# Patient Record
Sex: FEMALE | URBAN - METROPOLITAN AREA
[De-identification: names, ages, dates, MRNs, and addresses within clinical notes are randomized per-mention and may not be internally consistent; named-entity substitution may affect disease eponyms.]

---

## 2023-03-01 ENCOUNTER — APPOINTMENT (RX ONLY)
Age: 28
Setting detail: DERMATOLOGY
End: 2023-03-01

## 2023-03-01 DIAGNOSIS — T07XXXA INSECT BITE, NONVENOMOUS, OF OTHER, MULTIPLE, AND UNSPECIFIED SITES, WITHOUT MENTION OF INFECTION: ICD-10-CM

## 2023-03-01 DIAGNOSIS — D17 BENIGN LIPOMATOUS NEOPLASM: ICD-10-CM

## 2023-03-01 DIAGNOSIS — D49.2 NEOPLASM OF UNSPECIFIED BEHAVIOR OF BONE, SOFT TISSUE, AND SKIN: ICD-10-CM

## 2023-03-01 PROBLEM — D48.5 NEOPLASM OF UNCERTAIN BEHAVIOR OF SKIN: Status: ACTIVE | Noted: 2023-03-01

## 2023-03-01 PROBLEM — S30.861A INSECT BITE (NONVENOMOUS) OF ABDOMINAL WALL, INITIAL ENCOUNTER: Status: ACTIVE | Noted: 2023-03-01

## 2023-03-01 PROBLEM — D17.1 BENIGN LIPOMATOUS NEOPLASM OF SKIN AND SUBCUTANEOUS TISSUE OF TRUNK: Status: ACTIVE | Noted: 2023-03-01

## 2023-03-01 PROCEDURE — ? PATIENT SPECIFIC COUNSELING

## 2023-03-01 PROCEDURE — ? COUNSELING

## 2023-03-01 PROCEDURE — 99203 OFFICE O/P NEW LOW 30 MIN: CPT

## 2023-03-01 PROCEDURE — ? REFERRAL

## 2023-03-01 PROCEDURE — ? DIAGNOSIS COMMENT

## 2023-03-01 ASSESSMENT — LOCATION ZONE DERM
LOCATION ZONE: TRUNK
LOCATION ZONE: NECK

## 2023-03-01 ASSESSMENT — LOCATION SIMPLE DESCRIPTION DERM
LOCATION SIMPLE: ABDOMEN
LOCATION SIMPLE: LEFT UPPER BACK
LOCATION SIMPLE: RIGHT ANTERIOR NECK

## 2023-03-01 ASSESSMENT — LOCATION DETAILED DESCRIPTION DERM
LOCATION DETAILED: LEFT MEDIAL UPPER BACK
LOCATION DETAILED: RIGHT INFERIOR ANTERIOR NECK
LOCATION DETAILED: LEFT LATERAL ABDOMEN

## 2023-03-01 NOTE — PROCEDURE: COUNSELING
Detail Level: Simple
Patient Specific Counseling (Will Not Stick From Patient To Patient): Bug bites are much less common in the winter but she does have dogs that are not up to date on flea meds.  I recommended she treat both dogs.  If the lesions persist or come in repeated waves RTC.  Bedbugs are less likely since there is no history of travel. Also consider hot tub folliculitis (but timing is off) or miliaria.

## 2023-03-01 NOTE — PROCEDURE: PATIENT SPECIFIC COUNSELING
Detail Level: Zone
She needs this removed to see what it is, she may need imaging first which we discussed, will send referral letter to Dr. Sandhu at

## 2023-03-01 NOTE — HPI: CYST
How Severe Is Your Cyst?: moderate
Is This A New Presentation, Or A Follow-Up?: Cyst
Additional History: She also has a cyst on her back that she wants to be removed today. When she first noticed it, it was small and then grew. Now it is dormant. Pt wants it gone for her wedding. Pt has bug bites all over her stomach. She woke up one morning and it appeared. Her jacobson retrievers sleep in her bed. She goes hot tubbing. The last time she went to a hot tub was in December or January. They were really itchy. The bites appeared a few days ago. It has happened before. \\n\\nPt is on birth control, sertraline, and Adderall. Pt had bloodwork done with her thyroid. There was an issue with glucose. She was anemic. She had full-sized pills and it got better. She had a biopsy on her neck and it was benign. She couldn’t breathe. She had to leave school for it. The lump on her neck has been there since 2018. Pt wanted to get it removed when she had her biopsy but her parents couldn’t pay for it. Now that she is an adult, she can pay for the removal.

## 2023-03-01 NOTE — PROCEDURE: DIAGNOSIS COMMENT
Detail Level: Detailed
Comment: Dr. Page at [facility] for lipoma, back. May not be done in time for her wedding.
Render Risk Assessment In Note?: no